# Patient Record
Sex: FEMALE | Employment: UNEMPLOYED | URBAN - METROPOLITAN AREA
[De-identification: names, ages, dates, MRNs, and addresses within clinical notes are randomized per-mention and may not be internally consistent; named-entity substitution may affect disease eponyms.]

---

## 2017-02-27 ENCOUNTER — OFFICE VISIT (OUTPATIENT)
Dept: URGENT CARE | Facility: URGENT CARE | Age: 27
End: 2017-02-27
Payer: COMMERCIAL

## 2017-02-27 VITALS
OXYGEN SATURATION: 98 % | WEIGHT: 150.7 LBS | TEMPERATURE: 98.1 F | RESPIRATION RATE: 16 BRPM | SYSTOLIC BLOOD PRESSURE: 106 MMHG | DIASTOLIC BLOOD PRESSURE: 78 MMHG | BODY MASS INDEX: 27.56 KG/M2 | HEART RATE: 70 BPM

## 2017-02-27 DIAGNOSIS — R07.0 THROAT PAIN: ICD-10-CM

## 2017-02-27 DIAGNOSIS — J06.9 VIRAL URI: Primary | ICD-10-CM

## 2017-02-27 LAB
DEPRECATED S PYO AG THROAT QL EIA: NORMAL
MICRO REPORT STATUS: NORMAL
SPECIMEN SOURCE: NORMAL

## 2017-02-27 PROCEDURE — 87081 CULTURE SCREEN ONLY: CPT | Performed by: FAMILY MEDICINE

## 2017-02-27 PROCEDURE — 99213 OFFICE O/P EST LOW 20 MIN: CPT | Performed by: FAMILY MEDICINE

## 2017-02-27 PROCEDURE — 87880 STREP A ASSAY W/OPTIC: CPT | Performed by: FAMILY MEDICINE

## 2017-02-27 RX ORDER — CODEINE PHOSPHATE AND GUAIFENESIN 10; 100 MG/5ML; MG/5ML
SOLUTION ORAL
Qty: 120 ML | Refills: 0 | Status: SHIPPED | OUTPATIENT
Start: 2017-02-27 | End: 2017-03-04

## 2017-02-27 NOTE — PROGRESS NOTES
"SUBJECTIVE: Keri Jones is a 26 year old female presenting with a chief complaint of nasal congestion, cough  and sore throat.  Onset of symptoms was 5 day(s) ago.  Course of illness is same.    Severity moderate  Current and Associated symptoms: \"cold symptoms\"  Treatment measures tried include OTC meds.  Predisposing factors include None.    Past Medical History   Diagnosis Date     Irregular menstrual cycle      Menstrual Cycle, Irregular     No active medical problems      No Known Allergies  Social History   Substance Use Topics     Smoking status: Never Smoker     Smokeless tobacco: Never Used     Alcohol use No       ROS:  SKIN: no rash  GI: no vomiting    OBJECTIVE:  /78 (BP Location: Right arm, Patient Position: Chair, Cuff Size: Adult Regular)  Pulse 70  Temp 98.1  F (36.7  C) (Oral)  Resp 16  Wt 150 lb 11.2 oz (68.4 kg)  SpO2 98%  BMI 27.56 kg/[id number] APPEARANCE: healthy, alert and no distress  EYES: EOMI,  PERRL, conjunctiva clear  HENT: ear canals and TM's normal.  Nose and mouth without ulcers, erythema or lesions  NECK: supple, nontender, no lymphadenopathy  RESP: lungs clear to auscultation - no rales, rhonchi or wheezes  SKIN: no suspicious lesions or rashes      ICD-10-CM    1. Viral URI J06.9 guaiFENesin-codeine (ROBITUSSIN AC) 100-10 MG/5ML SOLN solution    B97.89    2. Throat pain R07.0 Strep, Rapid Screen     Beta strep group A culture     Fluids/Rest, f/u if worse/not any better    "

## 2017-02-27 NOTE — NURSING NOTE
"Chief Complaint   Patient presents with     Cough     cough, sore throat, chest pain for 3 days        Initial /78 (BP Location: Right arm, Patient Position: Chair, Cuff Size: Adult Regular)  Pulse 70  Temp 98.1  F (36.7  C) (Oral)  Resp 16  Wt 150 lb 11.2 oz (68.4 kg)  SpO2 98%  BMI 27.56 kg/m2 Estimated body mass index is 27.56 kg/(m^2) as calculated from the following:    Height as of 7/6/12: 5' 2\" (1.575 m).    Weight as of this encounter: 150 lb 11.2 oz (68.4 kg).      "

## 2017-02-27 NOTE — MR AVS SNAPSHOT
"              After Visit Summary   2017    Keri Jones    MRN: [id number]           Patient Information     Date Of Birth          1990        Visit Information        Provider Department      2017 9:45 AM Bradley Browne, DO [facility]        Today's Diagnoses     Viral URI    -  1    Throat pain           Follow-ups after your visit        Who to contact     If you have questions or need follow up information about today's clinic visit or your schedule please contact [facility] directly at [phone].  Normal or non-critical lab and imaging results will be communicated to you by YepLike!hart, letter or phone within 4 business days after the clinic has received the results. If you do not hear from us within 7 days, please contact the clinic through YepLike!hart or phone. If you have a critical or abnormal lab result, we will notify you by phone as soon as possible.  Submit refill requests through PetHub or call your pharmacy and they will forward the refill request to us. Please allow 3 business days for your refill to be completed.          Additional Information About Your Visit        MyChart Information     PetHub lets you send messages to your doctor, view your test results, renew your prescriptions, schedule appointments and more. To sign up, go to [email or web address] . Click on \"Log in\" on the left side of the screen, which will take you to the Welcome page. Then click on \"Sign up Now\" on the right side of the page.     You will be asked to enter the access code listed below, as well as some personal information. Please follow the directions to create your username and password.     Your access code is: [email or web address]  Expires: 2017 10:10 AM     Your access code will  in 90 days. If you need help or a new code, please call your [place] clinic or [phone].        Care EveryWhere ID     This is your Care " EveryWhere ID. This could be used by other organizations to access your [place] medical records  [id number]        Your Vitals Were     Pulse Temperature Respirations Pulse Oximetry BMI (Body Mass Index)       70 98.1  F (36.7  C) (Oral) 16 98% 27.56 kg/m2        Blood Pressure from Last 3 Encounters:   02/27/17 106/78   03/16/14 122/67   03/13/14 124/72    Weight from Last 3 Encounters:   02/27/17 150 lb 11.2 oz (68.4 kg)   03/16/14 151 lb 10.8 oz (68.8 kg)   07/06/12 181 lb (82.1 kg)              We Performed the Following     Beta strep group A culture     Strep, Rapid Screen          Today's Medication Changes          These changes are accurate as of: 2/27/17 10:10 AM.  If you have any questions, ask your nurse or doctor.               Start taking these medicines.        Dose/Directions    guaiFENesin-codeine 100-10 MG/5ML Soln solution   Commonly known as:  ROBITUSSIN AC   Used for:  Viral URI   Started by:  Bradley Browne, DO        1-2 tsp po q 4-6hrs prn cough   Quantity:  120 mL   Refills:  0            Where to get your medicines      Some of these will need a paper prescription and others can be bought over the counter.  Ask your nurse if you have questions.     Bring a paper prescription for each of these medications     guaiFENesin-codeine 100-10 MG/5ML Soln solution                Primary Care Provider    Physician No Ref-Primary       No address on file        Thank you!     Thank you for choosing [facility]  for your care. Our goal is always to provide you with excellent care. Hearing back from our patients is one way we can continue to improve our services. Please take a few minutes to complete the written survey that you may receive in the mail after your visit with us. Thank you!             Your Updated Medication List - Protect others around you: Learn how to safely use, store and throw away your medicines at www.disposemymeds.org.          This list is  accurate as of: 2/27/17 10:10 AM.  Always use your most recent med list.                   Brand Name Dispense Instructions for use    guaiFENesin-codeine 100-10 MG/5ML Soln solution    ROBITUSSIN AC    120 mL    1-2 tsp po q 4-6hrs prn cough       ibuprofen 200 MG tablet    ADVIL/MOTRIN     Take 1-2 tablets by mouth every 4 hours as needed       SUMAtriptan 25 MG tablet    IMITREX    30 tablet    Take 1 tablet (25 mg) by mouth at onset of headache for migraine       TYLENOL 325 MG tablet   Generic drug:  acetaminophen      Take 325-650 mg by mouth every 6 hours as needed

## 2017-03-01 LAB
BACTERIA SPEC CULT: NORMAL
MICRO REPORT STATUS: NORMAL
SPECIMEN SOURCE: NORMAL

## 2018-08-08 ENCOUNTER — COMMUNICATION - HEALTHEAST (OUTPATIENT)
Dept: SCHEDULING | Facility: CLINIC | Age: 28
End: 2018-08-08

## 2019-10-25 ENCOUNTER — TRANSFERRED RECORDS (OUTPATIENT)
Dept: HEALTH INFORMATION MANAGEMENT | Facility: CLINIC | Age: 29
End: 2019-10-25

## 2019-11-13 ENCOUNTER — TRANSFERRED RECORDS (OUTPATIENT)
Dept: HEALTH INFORMATION MANAGEMENT | Facility: CLINIC | Age: 29
End: 2019-11-13

## 2019-11-26 ENCOUNTER — OFFICE VISIT (OUTPATIENT)
Dept: OPHTHALMOLOGY | Facility: CLINIC | Age: 29
End: 2019-11-26
Attending: OPHTHALMOLOGY
Payer: COMMERCIAL

## 2019-11-26 DIAGNOSIS — H53.40 VISUAL FIELD DEFECT: ICD-10-CM

## 2019-11-26 DIAGNOSIS — H35.9 MACULOPATHY: Primary | ICD-10-CM

## 2019-11-26 DIAGNOSIS — H53.10 SUBJECTIVE VISUAL DISTURBANCE: Primary | ICD-10-CM

## 2019-11-26 PROCEDURE — 92083 EXTENDED VISUAL FIELD XM: CPT | Mod: ZF | Performed by: OPHTHALMOLOGY

## 2019-11-26 PROCEDURE — G0463 HOSPITAL OUTPT CLINIC VISIT: HCPCS | Mod: ZF | Performed by: TECHNICIAN/TECHNOLOGIST

## 2019-11-26 PROCEDURE — 92133 CPTRZD OPH DX IMG PST SGM ON: CPT | Mod: ZF | Performed by: OPHTHALMOLOGY

## 2019-11-26 ASSESSMENT — CUP TO DISC RATIO
OD_RATIO: 0.3
OS_RATIO: 0.3

## 2019-11-26 ASSESSMENT — TONOMETRY
OS_IOP_MMHG: 15
IOP_METHOD: ICARE
OD_IOP_MMHG: 14

## 2019-11-26 ASSESSMENT — SLIT LAMP EXAM - LIDS
COMMENTS: NORMAL
COMMENTS: NORMAL

## 2019-11-26 ASSESSMENT — REFRACTION_WEARINGRX
OD_SPHERE: -5.50
OD_CYLINDER: +1.50
OS_AXIS: 077
OS_CYLINDER: +1.50
OS_SPHERE: -5.50
SPECS_TYPE: SVL
OD_AXIS: 083

## 2019-11-26 ASSESSMENT — REFRACTION_MANIFEST
OD_SPHERE: -5.75
OD_AXIS: 085
OD_CYLINDER: +1.50
OS_SPHERE: -5.50
OS_AXIS: 075
OS_CYLINDER: +1.50

## 2019-11-26 ASSESSMENT — CONF VISUAL FIELD
METHOD: COUNTING FINGERS
OS_NORMAL: 1
OD_SUPERIOR_TEMPORAL_RESTRICTION: 2
OD_SUPERIOR_NASAL_RESTRICTION: 2

## 2019-11-26 ASSESSMENT — VISUAL ACUITY
METHOD: SNELLEN - LINEAR
CORRECTION_TYPE: GLASSES
OS_CC: 20/20

## 2019-11-26 ASSESSMENT — EXTERNAL EXAM - RIGHT EYE: OD_EXAM: NORMAL

## 2019-11-26 ASSESSMENT — EXTERNAL EXAM - LEFT EYE: OS_EXAM: NORMAL

## 2019-11-26 NOTE — Clinical Note
11/26/2019       RE: [address] [facility]  [address] [facility] [address]     Dear Colleague,    Thank you for referring your patient, Keri Jones, to the E[facility]. Please see a copy of my visit note below.           Assessment & Plan     Keri Jones is a 29 year old female with the following diagnoses:   1. Subjective visual disturbance       Patient is a 29 year old female sent by Dr. Caro for consutlation of decreased vision of unknown etiology in the right eye. She reports that she had a yearly eye exam a month ago . She notes that for the last 6 years her vision in the right eye has been poor. She noted sudden change in the RIGHT eye during a summer day. The vision has remained stable since onset until the past year. She notes that she had a severe headache for which she went to the ED and notes that her vision in the right eye has been decreased since then.  She has been having migraine headaches for the past 10 years and is having migraine headache nearly every day. She follows with a headache specialist and takes Imitrex to stop headaches. She notes that with occurrence of a strong migraine she will have difference in sensation between her two hands. She had a MRI orbit 6 years ago and has not had one since. MRI brain last year.     POH: Refractive error  PMH: Migraine headaches  FH: Negative for glaucoma, macular degeneration; Negative for lupus, rheumatoid arthritis    Visual acuity is 20/400 right eye and 20/20 left eye. Intraocular pressure is normal both eyes. Pupils with afferent pupillary defect right eye. Color plates unable right eye. Confrontational visual fields with superior defect right eye 0/11 right eye. Motility full both eyes. Slit lamp exam normal both eyes.  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 OCT rNFL  shows neither thickening nor thinning in either eye with general thickness of 100 both eyes.  The macular OCT shows thinning of the outer layers in the foveal region in the right eye.    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 She does not have photopsias.   I reviewed her MRI images from 2014 and 2018 and there is no lesion on the optic nerve.  Her RNFL is normal in the right eye.  I do not see an APD.  Overall I do not find evidence of a right optic neuropathy.  I do not think that neuroimaging would be beneficial at this point in time.  She clearly has a lesion in the foveal region which is likely causing her central scotoma in her symptoms.  Although her visual acuity is much worse than one would expect with this particular lesion, I believe it is the cause of her symptoms.    Follow up in 1 year here or closer to home, sooner as needed for worsening             Attending Physician Attestation:  Complete documentation of historical and exam elements from today's encounter can be found in the full encounter summary report (not reduplicated in this progress note).  I personally obtained the chief complaint(s) and history of present illness.  I confirmed and edited as necessary the review of systems, past medical/surgical history, family history, social history, and examination findings as documented by others; and I examined the patient myself.  I personally reviewed the relevant tests, images, and reports as documented above.  I formulated and edited as necessary the assessment and plan and discussed the findings and management plan with the patient and family. I personally reviewed the ophthalmic test(s) associated with this encounter, agree with the interpretation(s) as documented by the resident/fellow, and have edited the corresponding report(s) as necessary.  - Jacoby Dozier  MD [place]  Ophthalmology, PGY-5  Neuro-Ophthalmology Fellow      Again, thank you for allowing me to participate in the care of your patient.      Sincerely,    Jacoby Garcia MD

## 2019-11-26 NOTE — PROGRESS NOTES
Assessment & Plan     Keri Jones is a 29 year old female with the following diagnoses:   1. Maculopathy    2. Visual field defect       Patient is a 29 year old female sent by Dr. Caro for consutlation of decreased vision of unknown etiology in the right eye. She reports that she had a yearly eye exam a month ago . She notes that for the last 6 years her vision in the right eye has been poor. She noted sudden change in the RIGHT eye during a summer day. The vision has remained stable since onset until the past year. She notes that she had a severe headache for which she went to the ED and notes that her vision in the right eye has been decreased since then.  She has been having migraine headaches for the past 10 years and is having migraine headache nearly every day. She follows with a headache specialist and takes Imitrex to stop headaches. She notes that with occurrence of a strong migraine she will have difference in sensation between her two hands. She had a MRI orbit 6 years ago and has not had one since. MRI brain last year.  She has seen a retina specialist who found a foveal lesion.  He was wondering whether she may have an optic neuropathy on top of that.    POH: Refractive error  PMH: Migraine headaches  FH: Negative for glaucoma, macular degeneration; Negative for lupus, rheumatoid arthritis    Visual acuity is 20/400 right eye and 20/20 left eye. Intraocular pressure is normal both eyes. Pupils with afferent pupillary defect right eye. Color plates unable right eye. Confrontational visual fields with superior defect right eye 0/11 right eye. Motility full both eyes. Slit lamp exam normal both eyes.  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 OCT rNFL shows neither thickening nor thinning in either eye with general thickness of 100 both eyes.  The macular OCT shows thinning of the outer  layers in the foveal region in the right eye.    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 She does not have photopsias.   I reviewed her MRI images from 2014 and 2018 and there is no lesion on the optic nerve.  Her RNFL is normal in the right eye.  I do not see an APD.  Overall I do not find evidence of a right optic neuropathy.  I do not think that neuroimaging would be beneficial at this point in time.  She clearly has a lesion in the foveal region which is likely causing her central scotoma in her symptoms.  Although her visual acuity is much worse than one would expect with this particular lesion, I believe it is the cause of her symptoms.    Follow up in 1 year here or closer to home, sooner as needed for worsening             Attending Physician Attestation:  Complete documentation of historical and exam elements from today's encounter can be found in the full encounter summary report (not reduplicated in this progress note).  I personally obtained the chief complaint(s) and history of present illness.  I confirmed and edited as necessary the review of systems, past medical/surgical history, family history, social history, and examination findings as documented by others; and I examined the patient myself.  I personally reviewed the relevant tests, images, and reports as documented above.  I formulated and edited as necessary the assessment and plan and discussed the findings and management plan with the patient and family. I personally reviewed the ophthalmic test(s) associated with this encounter, agree with the interpretation(s) as documented by the resident/fellow, and have edited the corresponding report(s) as necessary.  - Jacoby Nobles MD  Ophthalmology, PGY-5  Neuro-Ophthalmology Fellow

## 2019-11-26 NOTE — NURSING NOTE
"Chief Complaint(s) and History of Present Illness(es)     New Patient     In right eye (Consult for decreased vision with unknown etiology).  This started 6 years ago.  Since onset it is stable.  Associated symptoms include headache.  Negative for double vision and eye pain.              Comments     Patient states MRI for her \"eye\" was done 6 years ago and was also the last one. Has done several other MRIs but not relevant to her eyes. MRI was done at [facility].   Dry eyes with contacts wear--> which causes headaches and vision becomes blur after a few hours.     BERNARD Liu 11/26/2019 7:45 AM                "

## 2019-11-26 NOTE — LETTER
2019         RE:  :  MRN: Keri Jones  1990  [id number]     Dear Dr. Caro,    Thank you for asking me to see your very pleasant patient, Keri Jones, in neuro-ophthalmic consultation.  I would like to thank you for sending your records and I have summarized them in the history of present illness. She presented with her significant other who provided additional history.  My assessment and plan are below.  For further details, please see my attached clinic note.      Assessment & Plan     Keri Jones is a 29 year old female with the following diagnoses:   1. Maculopathy    2. Visual field defect       Patient is a 29 year old female sent by Dr. Caro for consutlation of decreased vision of unknown etiology in the right eye. She reports that she had a yearly eye exam a month ago . She notes that for the last 6 years her vision in the right eye has been poor. She noted sudden change in the RIGHT eye during a summer day. The vision has remained stable since onset until the past year. She notes that she had a severe headache for which she went to the ED and notes that her vision in the right eye has been decreased since then.  She has been having migraine headaches for the past 10 years and is having migraine headache nearly every day. She follows with a headache specialist and takes Imitrex to stop headaches. She notes that with occurrence of a strong migraine she will have difference in sensation between her two hands. She had a MRI orbit 6 years ago and has not had one since. MRI brain last year.  She has seen a retina specialist who found a foveal lesion.  He was wondering whether she may have an optic neuropathy on top of that.    POH: Refractive error  PMH: Migraine headaches  FH: Negative for glaucoma, macular degeneration; Negative for lupus, rheumatoid arthritis    Visual acuity is 20/400 right eye and 20/20 left eye. Intraocular pressure is normal both eyes.  Pupils with afferent pupillary defect right eye. Color plates unable right eye. Confrontational visual fields with superior defect right eye 0/11 right eye. Motility full both eyes. Slit lamp exam normal both eyes.  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 OCT rNFL shows neither thickening nor thinning in either eye with general thickness of 100 both eyes.  The macular OCT shows thinning of the outer layers in the foveal region in the right eye.    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 She does not have photopsias.   I reviewed her MRI images from 2014 and 2018 and there is no lesion on the optic nerve.  Her RNFL is normal in the right eye.  I do not see an APD.  Overall I do not find evidence of a right optic neuropathy.  I do not think that neuroimaging would be beneficial at this point in time.  She clearly has a lesion in the foveal region which is likely causing her central scotoma in her symptoms.  Although her visual acuity is much worse than one would expect with this particular lesion, I believe it is the cause of her symptoms.    Follow up in 1 year here or closer to home, sooner as needed for worsening       Again, thank you for allowing me to participate in the care of your patient.      Sincerely,    Jacoby Garcia MD  Professor  Ophthalmology Residency   Director of Neuro-Ophthalmology  [facility] [facility] Endowed Chair  Departments of Ophthalmology, Neurology, and Neurosurgery  [facility] [address] [place]  [address]  T - [phone]  F - [phone]  STEPHANIE [email or web address]      CC: Anali Caro  [facility]  [address] [place] [id number]  VIA Mail     Salty Mchugh MD  [facility]  [address] [place]  [address]  [place] [address]  VIA Facsimile: [phone]       DX = macular lesion

## 2020-03-02 ENCOUNTER — HEALTH MAINTENANCE LETTER (OUTPATIENT)
Age: 30
End: 2020-03-02

## 2020-12-14 ENCOUNTER — HEALTH MAINTENANCE LETTER (OUTPATIENT)
Age: 30
End: 2020-12-14

## 2021-04-18 ENCOUNTER — HEALTH MAINTENANCE LETTER (OUTPATIENT)
Age: 31
End: 2021-04-18

## 2021-10-02 ENCOUNTER — HEALTH MAINTENANCE LETTER (OUTPATIENT)
Age: 31
End: 2021-10-02

## 2022-05-14 ENCOUNTER — HEALTH MAINTENANCE LETTER (OUTPATIENT)
Age: 32
End: 2022-05-14

## 2022-09-03 ENCOUNTER — HEALTH MAINTENANCE LETTER (OUTPATIENT)
Age: 32
End: 2022-09-03

## 2023-06-03 ENCOUNTER — HEALTH MAINTENANCE LETTER (OUTPATIENT)
Age: 33
End: 2023-06-03